# Patient Record
Sex: MALE | Race: BLACK OR AFRICAN AMERICAN | NOT HISPANIC OR LATINO | URBAN - METROPOLITAN AREA
[De-identification: names, ages, dates, MRNs, and addresses within clinical notes are randomized per-mention and may not be internally consistent; named-entity substitution may affect disease eponyms.]

---

## 2019-08-18 ENCOUNTER — HOSPITAL ENCOUNTER (EMERGENCY)
Facility: HOSPITAL | Age: 49
Discharge: HOME | End: 2019-08-18
Attending: EMERGENCY MEDICINE

## 2019-08-18 VITALS
HEART RATE: 68 BPM | TEMPERATURE: 99 F | SYSTOLIC BLOOD PRESSURE: 127 MMHG | DIASTOLIC BLOOD PRESSURE: 69 MMHG | WEIGHT: 300 LBS | HEIGHT: 74 IN | BODY MASS INDEX: 38.5 KG/M2 | OXYGEN SATURATION: 97 % | RESPIRATION RATE: 18 BRPM

## 2019-08-18 DIAGNOSIS — J30.2 SEASONAL ALLERGIES: ICD-10-CM

## 2019-08-18 DIAGNOSIS — R04.0 ACUTE ANTERIOR EPISTAXIS: Primary | ICD-10-CM

## 2019-08-18 PROCEDURE — 25000003 PHARM REV CODE 250: Mod: ER | Performed by: EMERGENCY MEDICINE

## 2019-08-18 PROCEDURE — 99283 EMERGENCY DEPT VISIT LOW MDM: CPT | Mod: ER

## 2019-08-18 RX ORDER — FLUTICASONE PROPIONATE 50 MCG
1 SPRAY, SUSPENSION (ML) NASAL 2 TIMES DAILY
Qty: 1 BOTTLE | Refills: 0 | Status: SHIPPED | OUTPATIENT
Start: 2019-08-18

## 2019-08-18 RX ORDER — PETROLATUM,WHITE
1 OINTMENT IN PACKET (GRAM) TOPICAL 2 TIMES DAILY
Qty: 28 TUBE | Refills: 0 | Status: SHIPPED | OUTPATIENT
Start: 2019-08-18 | End: 2019-09-01

## 2019-08-18 RX ADMIN — Medication 2 SPRAY: at 06:08

## 2019-08-18 NOTE — ED NOTES
Pt. Applied ice on his nose,  Put a ball of tissue under his top lip and applied pressure with his tongue.  The stopped the bleeding on his own.  Is not on blood thinners.    Started about a month ago after starting new medications  A month ago.    Occurred  Two times today.  Was grilling out side.

## 2019-08-18 NOTE — ED PROVIDER NOTES
Encounter Date: 8/18/2019    SCRIBE #1 NOTE: I, Maureen Solis, am scribing for, and in the presence of,  Dr. Dee. I have scribed the following portions of the note - Other sections scribed: HPI, ROS, PE.       History     Chief Complaint   Patient presents with    Epistaxis     onset today, was able to control it, then began again later this afternoon     James Macias is a 49 y.o. male who presents to the ED complaining of sudden onset left sided nosebleed while standing up cooking today. Bleeding stopped, but returned later in the day. Bleeding is resolved now. This has happened three times before. He was prescribed Naproxen and Gabapentin and after starting those medication his nose began to bleed. Denies cough or itching/watery eyes. No ear pain. He does have seasonal allergies.    The history is provided by the patient.     Review of patient's allergies indicates:  No Known Allergies  History reviewed. No pertinent past medical history.  History reviewed. No pertinent surgical history.  History reviewed. No pertinent family history.  Social History     Tobacco Use    Smoking status: Never Smoker   Substance Use Topics    Alcohol use: Yes     Comment: occ    Drug use: Not on file     Review of Systems   Constitutional: Negative for fever.   HENT: Positive for nosebleeds. Negative for congestion and ear pain.    Eyes: Negative for itching.   Respiratory: Negative for cough and shortness of breath.    Cardiovascular: Negative for chest pain.   Gastrointestinal: Negative for abdominal pain and vomiting.   Neurological: Negative for headaches.   All other systems reviewed and are negative.      Physical Exam     Initial Vitals [08/18/19 1703]   BP Pulse Resp Temp SpO2   122/80 71 19 99.1 °F (37.3 °C) 96 %      MAP       --           Patient gave consent to have physical exam performed.    Physical Exam    Nursing note and vitals reviewed.  Constitutional: He appears well-developed and well-nourished.    HENT:   Head: Normocephalic and atraumatic.   Right Ear: Tympanic membrane and external ear normal.   Left Ear: Tympanic membrane and external ear normal.   Nose: Right sinus exhibits no maxillary sinus tenderness and no frontal sinus tenderness. Left sinus exhibits no maxillary sinus tenderness and no frontal sinus tenderness.   Mouth/Throat: Oropharynx is clear and moist.   Tear in skin on medial aneterior septum of left nostril.   Eyes: Conjunctivae and EOM are normal. Pupils are equal, round, and reactive to light.   Neck: Normal range of motion. Neck supple.   Cardiovascular: Normal rate, regular rhythm, normal heart sounds and intact distal pulses. Exam reveals no gallop and no friction rub.    No murmur heard.  Pulmonary/Chest: Breath sounds normal. No stridor. No respiratory distress. He has no wheezes. He has no rhonchi. He has no rales. He exhibits no tenderness.   Abdominal: Soft. Bowel sounds are normal. He exhibits no distension and no mass. There is no tenderness. There is no rigidity, no rebound and no guarding.   Musculoskeletal: Normal range of motion.   Neurological: He is alert and oriented to person, place, and time. No cranial nerve deficit or sensory deficit. GCS score is 15. GCS eye subscore is 4. GCS verbal subscore is 5. GCS motor subscore is 6.   Skin: Skin is warm and dry. Capillary refill takes less than 2 seconds. No rash noted.   Psychiatric: He has a normal mood and affect. His behavior is normal.         ED Course   Procedures  Labs Reviewed - No data to display         Chief complaint: L epistaxis   Differential diagnosis:  Seasonal allergies, anterior epistaxis, posterior epistaxis, and sinusitis  Treatment in the ED, Afrin and lubricant  Patient reports feeling better after treatment in the ER, phenylephrine spray.  Bleeding has resolved  Discussed treatment, prescriptions.  Discharge home with flonase and Vaseline.  Fill and take prescriptions as directed.  Return to the ED if  symptoms worsen or do not resolve.   Answered questions and discussed discharge plan.    Patient feels better and is ready for discharge.  Follow up with PCP/specialist in 1 day.                  Scribe Attestation:   Scribe #1: I performed the above scribed service and the documentation accurately describes the services I performed. I attest to the accuracy of the note.     I, Dr. Mahi Dee, personally performed the services described in this documentation. This document was produced by a scribe under my direction and in my presence. All medical record entries made by the scribe were at my direction and in my presence.  I have reviewed the chart and agree that the record reflects my personal performance and is accurate and complete. Mahi Dee DO.     08/18/2019 6:43 PM               Clinical Impression:     1. Acute anterior epistaxis    2. Seasonal allergies                                   Mahi Dee DO  08/18/19 1847

## 2019-08-31 ENCOUNTER — HOSPITAL ENCOUNTER (EMERGENCY)
Facility: HOSPITAL | Age: 49
Discharge: HOME | End: 2019-08-31
Attending: EMERGENCY MEDICINE
Payer: OTHER GOVERNMENT

## 2019-08-31 VITALS
RESPIRATION RATE: 16 BRPM | OXYGEN SATURATION: 97 % | SYSTOLIC BLOOD PRESSURE: 118 MMHG | HEIGHT: 74 IN | WEIGHT: 300 LBS | DIASTOLIC BLOOD PRESSURE: 68 MMHG | TEMPERATURE: 98 F | HEART RATE: 72 BPM | BODY MASS INDEX: 38.5 KG/M2

## 2019-08-31 DIAGNOSIS — R04.0 LEFT-SIDED EPISTAXIS: Primary | ICD-10-CM

## 2019-08-31 PROCEDURE — 25000003 PHARM REV CODE 250: Mod: ER | Performed by: EMERGENCY MEDICINE

## 2019-08-31 PROCEDURE — 30903 CONTROL OF NOSEBLEED: CPT | Mod: LT,ER

## 2019-08-31 PROCEDURE — 99283 EMERGENCY DEPT VISIT LOW MDM: CPT | Mod: 25,ER

## 2019-08-31 RX ORDER — ONDANSETRON 2 MG/ML
4 INJECTION INTRAMUSCULAR; INTRAVENOUS
Status: DISCONTINUED | OUTPATIENT
Start: 2019-08-31 | End: 2019-08-31

## 2019-08-31 RX ORDER — SILVER NITRATE 38.21; 12.74 MG/1; MG/1
1 STICK TOPICAL ONCE
Status: COMPLETED | OUTPATIENT
Start: 2019-08-31 | End: 2019-08-31

## 2019-08-31 RX ORDER — CEPHALEXIN 500 MG/1
500 CAPSULE ORAL
Status: COMPLETED | OUTPATIENT
Start: 2019-08-31 | End: 2019-08-31

## 2019-08-31 RX ORDER — HYDROCODONE BITARTRATE AND ACETAMINOPHEN 5; 325 MG/1; MG/1
2 TABLET ORAL
Status: DISCONTINUED | OUTPATIENT
Start: 2019-08-31 | End: 2019-08-31 | Stop reason: HOSPADM

## 2019-08-31 RX ADMIN — Medication 1 SPRAY: at 05:08

## 2019-08-31 RX ADMIN — SILVER NITRATE APPLICATORS 1 APPLICATOR: 25; 75 STICK TOPICAL at 05:08

## 2019-08-31 RX ADMIN — CEPHALEXIN 500 MG: 500 CAPSULE ORAL at 06:08

## 2019-08-31 NOTE — DISCHARGE INSTRUCTIONS
Use your Afrin nasal spray if the bleeding recurs and known hold pressure for 10 min without letting hot.  You may do this 3 times.  If the bleeding recurs, go to the emergency room.  Rest.  No strenuous activity for a few days.

## 2019-08-31 NOTE — ED PROVIDER NOTES
Encounter Date: 8/31/2019    SCRIBE #1 NOTE: I, Abbi Diez, am scribing for, and in the presence of,  Dr. Pimentel . I have scribed the following portions of the note - Other sections scribed: HPI, ROS, PE .       History     Chief Complaint   Patient presents with    Epistaxis     Spontaneous nose bleeds onset 3 hours ago.  Here for same on 8/18/19.     CC: Nosebleed  49 y.o male presents to the ED with a nosebleed that started 3 hours ago. He was seen at this ED for the same complaint on 8/18/19.  Patient said that he had been doing fine until today.  The bleeding lasted for about a minute and was easily controlled.  However this occurred 2 more times and the last time the bleeding lasted for about 5 min.  The bleeding is only from the left side of the nose. He denies trauma or manipulating the nose.  No URI type symptoms.  He is not on blood thinners and does not bruise or bleed easily.  0/10 pain    The history is provided by the patient. No  was used.     Review of patient's allergies indicates:  No Known Allergies  History reviewed. No pertinent past medical history.  History reviewed. No pertinent surgical history.  History reviewed. No pertinent family history.  Social History     Tobacco Use    Smoking status: Never Smoker    Smokeless tobacco: Never Used   Substance Use Topics    Alcohol use: Yes     Comment: occ    Drug use: Never     Review of Systems   HENT: Positive for nosebleeds. Negative for congestion.    Respiratory: Negative for shortness of breath.    Gastrointestinal: Negative for vomiting.   Neurological: Positive for headaches.       Physical Exam     Initial Vitals [08/31/19 1610]   BP Pulse Resp Temp SpO2   109/70 82 16 98.1 °F (36.7 °C) 97 %      MAP       --         Physical Exam    Nursing note and vitals reviewed.  Constitutional: He appears well-developed and well-nourished. He is not diaphoretic. No distress.   HENT:   Head: Normocephalic and atraumatic.    Nose: Mucosal edema present. No epistaxis.   Small erythematous area to the left nasal mucosa on the septal side without active bleeding   Eyes: EOM are normal.   Neck: Normal range of motion.   Pulmonary/Chest: No respiratory distress.   Musculoskeletal: Normal range of motion.   Neurological: He is alert and oriented to person, place, and time. No cranial nerve deficit or sensory deficit.   Skin: Skin is warm and dry. Capillary refill takes less than 2 seconds.   Psychiatric: He has a normal mood and affect. His behavior is normal.         ED Course   Epistaxis Mgmt  Date/Time: 8/31/2019 6:21 PM  Performed by: Saige Pimentel MD  Authorized by: Saige Pimentel MD   Treatment site: left anterior  Repair method: Rhino Rocket and silver nitrate  Post-procedure assessment: bleeding decreased  Treatment complexity: complex  Recurrence: recurrence of recent bleed  Patient tolerance: Patient tolerated the procedure well with no immediate complications  Comments: Rhino rocket  was removed at the patient's request and then bleeding was controlled with pressure and Afrin nasal spray        Labs Reviewed - No data to display       Imaging Results    None          Medical Decision Making:   Initial Assessment:   49-year-old presents with epistaxis to the left side of his nose. This occurred 2 weeks ago and again today.  On my exam patient is not bleeding.  He does have an area that appears inflamed  ED Management:  Patient did have mild bleeding in the ER.  I cauterized the area that was concerning.  As soon as I did this the patient blew his nose forcefully and the bleeding started.  Patient was bleeding a lot from the left naris showed nasal packing was placed.  This did control the bleeding but patient did not want the packing to stay and did not feel that it was helping.  Therefore the packing was taken out.  Afrin nasal spray was instilled in the patient's nose and he held pressure for 10 min without recurrence  of bleeding.  Patient absolutely refuses to have the packing place.  At the time of this dictation the bleeding has stopped.  I did give patient clear instructions on how to stop the bleeding at home and when to return to the emergency room.  He will be given number for ENT for close follow-up.            Scribe Attestation:   Scribe #1: I performed the above scribed service and the documentation accurately describes the services I performed. I attest to the accuracy of the note.       I, Dr. Saige Pimentel, personally performed the services described in this documentation. All medical record entries made by the scribe were at my direction and in my presence.  I have reviewed the chart and agree that the record reflects my personal performance and is accurate and complete. Saige Pimentel MD.  6:22 PM 08/31/2019             Clinical Impression:     1. Left-sided epistaxis                                   Saige Pimentel MD  08/31/19 1825

## 2019-09-04 NOTE — ADDENDUM NOTE
Encounter addended by: Muriel Brooks on: 9/4/2019 6:43 PM   Actions taken: Charge Capture section accepted, Visit Navigator Flowsheet section accepted

## 2021-11-23 ENCOUNTER — INFUSION (OUTPATIENT)
Dept: INFECTIOUS DISEASES | Facility: HOSPITAL | Age: 51
End: 2021-11-23
Attending: INTERNAL MEDICINE
Payer: OTHER GOVERNMENT

## 2021-11-23 VITALS
TEMPERATURE: 100 F | BODY MASS INDEX: 34.65 KG/M2 | RESPIRATION RATE: 18 BRPM | OXYGEN SATURATION: 95 % | SYSTOLIC BLOOD PRESSURE: 141 MMHG | HEART RATE: 88 BPM | DIASTOLIC BLOOD PRESSURE: 80 MMHG | HEIGHT: 74 IN | WEIGHT: 270 LBS

## 2021-11-23 DIAGNOSIS — U07.1 COVID: Primary | ICD-10-CM

## 2021-11-23 PROCEDURE — 25000003 PHARM REV CODE 250: Performed by: INTERNAL MEDICINE

## 2021-11-23 PROCEDURE — M0243 CASIRIVI AND IMDEVI INFUSION: HCPCS | Performed by: INTERNAL MEDICINE

## 2021-11-23 PROCEDURE — 63600175 PHARM REV CODE 636 W HCPCS: Performed by: INTERNAL MEDICINE

## 2021-11-23 RX ORDER — ACETAMINOPHEN 325 MG/1
650 TABLET ORAL ONCE AS NEEDED
Status: ACTIVE | OUTPATIENT
Start: 2021-11-23 | End: 2033-04-21

## 2021-11-23 RX ORDER — ONDANSETRON 4 MG/1
4 TABLET, ORALLY DISINTEGRATING ORAL ONCE AS NEEDED
Status: ACTIVE | OUTPATIENT
Start: 2021-11-23 | End: 2033-04-21

## 2021-11-23 RX ORDER — ALBUTEROL SULFATE 90 UG/1
2 AEROSOL, METERED RESPIRATORY (INHALATION)
Status: ACTIVE | OUTPATIENT
Start: 2021-11-23

## 2021-11-23 RX ORDER — DIPHENHYDRAMINE HYDROCHLORIDE 50 MG/ML
25 INJECTION INTRAMUSCULAR; INTRAVENOUS ONCE AS NEEDED
Status: ACTIVE | OUTPATIENT
Start: 2021-11-23 | End: 2033-04-21

## 2021-11-23 RX ORDER — SODIUM CHLORIDE 0.9 % (FLUSH) 0.9 %
10 SYRINGE (ML) INJECTION
Status: ACTIVE | OUTPATIENT
Start: 2021-11-23

## 2021-11-23 RX ORDER — EPINEPHRINE 0.3 MG/.3ML
0.3 INJECTION SUBCUTANEOUS
Status: ACTIVE | OUTPATIENT
Start: 2021-11-23

## 2021-11-23 RX ADMIN — CASIRIVIMAB AND IMDEVIMAB 600 MG: 600; 600 INJECTION, SOLUTION, CONCENTRATE INTRAVENOUS at 01:11
